# Patient Record
(demographics unavailable — no encounter records)

---

## 2024-12-18 NOTE — REVIEW OF SYSTEMS
[Negative] : Heme/Lymph [Sinus Pressure] : no sinus pressure [Abdominal Pain] : no abdominal pain [Heartburn] : no heartburn [Joint Pain] : no joint pain [Tingling (Paresthesia)] : no tingling

## 2024-12-18 NOTE — HISTORY OF PRESENT ILLNESS
[FreeTextEntry1] : 28 yo M here for evaluation prior to bariatric sx. pmhx of wpw, pAF s/p ablation.   having gastric sleeve sx with Dr Elmer Olvera-[facility]. good functional capacity-has been going to the gym, lost 10 lbs after significant weight gain this year. has been noticing episodes of pAF more frequently recently-2x in the past week. happens for seconds to minutes at a time. no sob during this period but feels uncomfortable and that his heart is racing. pt denies chest pain, palpitations, SOB, orthopnea. parathyroid levels were severely elevated - being evaluated by an endocrinologist.   ekg -sr

## 2025-02-09 NOTE — ASSESSMENT
[FreeTextEntry1] : 27M PMHx HTN, HLD, Mild Asthma, pAfib (on ASA), WPW with cardiac arrest in 1/2018 at OSH (but no PPM/AICD placed), PUD, diverticulitis, Bipolar Disorder, h/o multiple episodes of Pericarditis w/ Pericardial Effusion s/p drainage in 3/2018 at [facility] and hospitalization at [facility], former patient of Dr Wells presenting with complaints of recent flare up of diverticulitis, decreased appetite, irregular bowel habits.   #Irregular bowel habits  #Diverticulosis  Reviewed most recent hospital records, with no e/o diverticulitis. Has e/o diverticulosis on CT A/P. Presentation more consistent with IBS vs medication induced constipation with some component of overflow diarrhea.  -Recommended a trial of Metamucil or Citrucel  -Ordered for CRP, fecal calprotectin, celiac serologies -Will obtain outside 2023 colonoscopy record from Dr Kelvin Valle's office to review. Pending above BW and stool tests and review of report, will determine role for colonoscopy  -Provided samples for IBGard   #Reflux  #Class II Obesity, BMI 39.02 Controlled for now off medication  Denies any dysphagia, odynophagia  Reviewed previous EGD reports  Continue to monitor for now Ongoing f/u with bariatrics   RTC in 3months

## 2025-02-09 NOTE — ASSESSMENT
[FreeTextEntry1] : 27M PMHx HTN, HLD, Mild Asthma, pAfib (on ASA), WPW with cardiac arrest in 1/2018 at OSH (but no PPM/AICD placed), PUD, diverticulitis, Bipolar Disorder, h/o multiple episodes of Pericarditis w/ Pericardial Effusion s/p drainage in 3/2018 at [facility] and hospitalization at [facility], former patient of Dr Wells presenting with complaints of recent flare up of diverticulitis, decreased appetite, irregular bowel habits.   #Irregular bowel habits  #Diverticulosis  Reviewed most recent hospital records, with no e/o diverticulitis. Has e/o diverticulosis on CT A/P. Presentation more consistent with IBS vs medication induced constipation with some component of overflow diarrhea.  -Recommended a trial of Metamucil or Citrucel  -Ordered for CRP, fecal calprotectin, celiac serologies -Will obtain outside 2023 colonoscopy record from Dr Kelvin Valle's office to review. Pending above BW and stool tests and review of report, will determine role for colonoscopy  -Provided samples for IBGard   #Reflux  #Class II Obesity, BMI 39.02 Controlled for now off medication  Denies any dysphagia, odynophagia  Reviewed previous EGD reports  Continue to monitor for now Ongoing f/u with bariatrics   RTC in 3months

## 2025-02-09 NOTE — HISTORY OF PRESENT ILLNESS
[FreeTextEntry1] : HPI- 27M PMHx HTN, HLD, Mild Asthma, pAfib (on ASA), WPW with cardiac arrest in 1/2018 at OSH (but no PPM/AICD placed), PUD, diverticulitis, Bipolar Disorder, h/o multiple episodes of Pericarditis w/ Pericardial Effusion s/p drainage in 3/2018 at [facility] and hospitalization at [facility], former patient of Dr Wells presenting with complaints of recent flare up of diverticulitis, decreased appetite, irregular bowel habits.   Patient presented to [facility] ED on 1/14/25, at which time he presented with complaints of abdominal pain. With mild leukocytosis 13.24. Alk phos 194, lipase WNL (11). Remainder of BW WNL. CT A/P with IV contrast (no PO) revealing no bowel inflammation. With e/o colonic diverticulosis w/o e/o diverticulitis. No bowel obstruction. Patient states receiving IV abxs when he was in the ED however per record review, no abxs given. Only given Morphine, IV Tylenol, Zofran and IVF. Patient discharged from ED and recommended for outpatient GI f/u.   Patient states his pain has since resolved. Notes that historically he has had multiple flare ups of his diverticulitis, requiring several courses of abxs in the past for this (per record review, see multiple courses of abxs prescribed in the past, Augmentin).   Notes that preceding this ED visit, he noticed a change in his bowel habits 3-4 months preceding this visit. Notes that he is either backed up for several days or has diarrhea for several days at a time. Currently denies any pain now or 3-4 months ago when he noticed a change in his bowel habits. Since his ED visit, has had a decrease in his appetite, lost approx 10 lbs (per record review), and feels like he's noticed his stools to look thin. Non-bloody stools.   Previously followed with Dr Wells in the past for hematochezia.   Patient states he has a history of reflux. Well controlled, currently not taking any antacids. Denies any dysphagia, odynophagia.   Remainder of ROS negative.  BW: Hgb 14.5 (1/29/24) Cr 0.84 (1/29/24)  PMHx - HTN, HLD, Mild Asthma, pAfib (on ASA), WPW with cardiac arrest in 1/2018 at OSH (but no PPM/AICD placed), PUD, diverticulitis, Bipolar Disorder, h/o multiple episodes of Pericarditis w/ Pericardial Effusion s/p drainage in 3/2018 at [facility] and hospitalization at [facility] PSHx - knee, hip, spine, appendectomy, CCY (2019) Rx - ASA, tramadol 50 mg TID PRN, lyrica 100 mg TID, ambien, mirtazapine for insmonia, metoprolol Supplements/herbs/OTC - denies A/C or NSAIDs? - denies FMHx -grew up in foster care Allergies - albuterol (causes Vtach), claritin EtOH -2-3x/week Smoking - never smoker Drugs - marijuana   VCE (10/29/2018) - normal  EGD (1/12/23): moderate HH, irregular GEJ, mild esophagitis, moderate gastritis, normal duodenum (rec start PPI)  EGD & Cscope 10/2018-hiatal hernia; normal cscope. Biopsies of stomach and TI normal.  EGD & Cscope 8/2018 - linear stomach erosions, 6mm clean based ulcer at fundus, bile gastritis ; cscope poor prep

## 2025-02-09 NOTE — HISTORY OF PRESENT ILLNESS
[FreeTextEntry1] : HPI- 27M PMHx HTN, HLD, Mild Asthma, pAfib (on ASA), WPW with cardiac arrest in 1/2018 at OSH (but no PPM/AICD placed), PUD, diverticulitis, Bipolar Disorder, h/o multiple episodes of Pericarditis w/ Pericardial Effusion s/p drainage in 3/2018 at [facility] and hospitalization at [facility], former patient of Dr Wells presenting with complaints of recent flare up of diverticulitis, decreased appetite, irregular bowel habits.   Patient presented to [facility] ED on 1/14/25, at which time he presented with complaints of abdominal pain. With mild leukocytosis 13.24. Alk phos 194, lipase WNL (11). Remainder of BW WNL. CT A/P with IV contrast (no PO) revealing no bowel inflammation. With e/o colonic diverticulosis w/o e/o diverticulitis. No bowel obstruction. Patient states receiving IV abxs when he was in the ED however per record review, no abxs given. Only given Morphine, IV Tylenol, Zofran and IVF. Patient discharged from ED and recommended for outpatient GI f/u.   Patient states his pain has since resolved. Notes that historically he has had multiple flare ups of his diverticulitis, requiring several courses of abxs in the past for this (per record review, see multiple courses of abxs prescribed in the past, Augmentin).   Notes that preceding this ED visit, he noticed a change in his bowel habits 3-4 months preceding this visit. Notes that he is either backed up for several days or has diarrhea for several days at a time. Currently denies any pain now or 3-4 months ago when he noticed a change in his bowel habits. Since his ED visit, has had a decrease in his appetite, lost approx 10 lbs (per record review), and feels like he's noticed his stools to look thin. Non-bloody stools.   Previously followed with Dr Wells in the past for hematochezia.   Patient states he has a history of reflux. Well controlled, currently not taking any antacids. Denies any dysphagia, odynophagia.   Remainder of ROS negative.  BW: Hgb 14.5 (1/29/24) Cr 0.84 (1/29/24)  PMHx - HTN, HLD, Mild Asthma, pAfib (on ASA), WPW with cardiac arrest in 1/2018 at OSH (but no PPM/AICD placed), PUD, diverticulitis, Bipolar Disorder, h/o multiple episodes of Pericarditis w/ Pericardial Effusion s/p drainage in 3/2018 at [facility] and hospitalization at [facility] PSHx - knee, hip, spine, appendectomy, CCY (2019) Rx - ASA, tramadol 50 mg TID PRN, lyrica 100 mg TID, ambien, mirtazapine for insmonia, metoprolol Supplements/herbs/OTC - denies A/C or NSAIDs? - denies FMHx -grew up in foster care Allergies - albuterol (causes Vtach), claritin EtOH -2-3x/week Smoking - never smoker Drugs - marijuana   VCE (10/29/2018) - normal  EGD (1/12/23): moderate HH, irregular GEJ, mild esophagitis, moderate gastritis, normal duodenum (rec start PPI)  EGD & Cscope 10/2018-hiatal hernia; normal cscope. Biopsies of stomach and TI normal.  EGD & Cscope 8/2018 - linear stomach erosions, 6mm clean based ulcer at fundus, bile gastritis ; cscope poor prep

## 2025-02-09 NOTE — REVIEW OF SYSTEMS
[Recent Weight Loss (___ Lbs)] : recent [unfilled] ~Ulb weight loss [As Noted in HPI] : as noted in HPI [Constipation] : constipation [Diarrhea] : diarrhea [Negative] : Heme/Lymph [Abdominal Pain] : no abdominal pain [Vomiting] : no vomiting [Heartburn] : no heartburn [Melena (black stool)] : no melena [Bleeding] : no bleeding [Fecal Incontinence (soiling)] : no fecal incontinence [Bloating (gassiness)] : no bloating

## 2025-02-09 NOTE — PHYSICAL EXAM
[Alert] : alert [Normal Voice/Communication] : normal voice/communication [Healthy Appearing] : healthy appearing [No Acute Distress] : no acute distress [Obese (BMI >= 30)] : obese (BMI >= 30) [Sclera] : the sclera and conjunctiva were normal [Hearing Threshold Finger Rub Not [place]] : hearing was normal [Normal Lips/Gums] : the lips and gums were normal [Oropharynx] : the oropharynx was normal [Normal Appearance] : the appearance of the neck was normal [No Neck Mass] : no neck mass was observed [No Respiratory Distress] : no respiratory distress [No Acc Muscle Use] : no accessory muscle use [Respiration, Rhythm And Depth] : normal respiratory rhythm and effort [No Masses] : no abdominal mass palpated [Abdomen Soft] : soft [Cervical Lymph Nodes Enlarged Posterior Bilaterally] : no posterior cervical lymphadenopathy [Supraclavicular Lymph Nodes Enlarged Bilaterally] : no supraclavicular lymphadenopathy [Axillary Lymph Nodes Enlarged Bilaterally] : no axillary lymphadenopathy [No CVA Tenderness] : no CVA  tenderness [No Spinal Tenderness] : no spinal tenderness [Abnormal Walk] : normal gait [No Clubbing, Cyanosis] : no clubbing or cyanosis of the fingernails [No Joint Swelling] : no joint swelling seen [Normal Color / Pigmentation] : normal skin color and pigmentation [] : no rash [Cranial Nerves Intact] : cranial nerves 2-12 were intact [Sensation] : the sensory exam was normal to light touch and pinprick [Motor Exam] : the motor exam was normal [Oriented To Time, Place, And Person] : oriented to person, place, and time [de-identified] : RUQ tenderness

## 2025-02-09 NOTE — PHYSICAL EXAM
[Alert] : alert [Normal Voice/Communication] : normal voice/communication [Healthy Appearing] : healthy appearing [No Acute Distress] : no acute distress [Obese (BMI >= 30)] : obese (BMI >= 30) [Sclera] : the sclera and conjunctiva were normal [Hearing Threshold Finger Rub Not [place]] : hearing was normal [Normal Lips/Gums] : the lips and gums were normal [Oropharynx] : the oropharynx was normal [Normal Appearance] : the appearance of the neck was normal [No Neck Mass] : no neck mass was observed [No Respiratory Distress] : no respiratory distress [No Acc Muscle Use] : no accessory muscle use [Respiration, Rhythm And Depth] : normal respiratory rhythm and effort [No Masses] : no abdominal mass palpated [Abdomen Soft] : soft [Cervical Lymph Nodes Enlarged Posterior Bilaterally] : no posterior cervical lymphadenopathy [Supraclavicular Lymph Nodes Enlarged Bilaterally] : no supraclavicular lymphadenopathy [Axillary Lymph Nodes Enlarged Bilaterally] : no axillary lymphadenopathy [No CVA Tenderness] : no CVA  tenderness [No Spinal Tenderness] : no spinal tenderness [Abnormal Walk] : normal gait [No Clubbing, Cyanosis] : no clubbing or cyanosis of the fingernails [No Joint Swelling] : no joint swelling seen [Normal Color / Pigmentation] : normal skin color and pigmentation [] : no rash [Cranial Nerves Intact] : cranial nerves 2-12 were intact [Sensation] : the sensory exam was normal to light touch and pinprick [Motor Exam] : the motor exam was normal [Oriented To Time, Place, And Person] : oriented to person, place, and time [de-identified] : RUQ tenderness

## 2025-04-15 NOTE — ASSESSMENT
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 Pt understands that given the year lapse in his workup, we will need to restart the bariatric testings for sx. Will need to obtain path and will obtain an UGI. Pt tachy to 124 and pt was diaphoretic in the room. Pt started to complain of severe lower abdominal pain and states over the weekend he went to an urgent care and was told to go to the ED for possible SBO as he presented with vomiting and constipation. Pt states he has not had a BM since sunday and has been in severe abdominal pain since sunday.   At this point HR was rechecked and was 106 with persistent diaphoresis and radial pulse was attempted to be palpated, was unable. Explained to pt given his clinical presentation, tachycardia, diaphoresis, and cardiac history, we are recommending him to go to [facility] ED immediately. Pt then stood up from exam table and stated "he needed to go to work" and had syncopal episode falling to the floor, did not appear to hit his head on counter and without LOC. VS were rechecked, tachy to 156, BP unable to be obtained. Pt was verbal and recalled syncopal event. EMS was called immediately and pt was taken to [facility] ED.   I, Dr. Frank Hwang personally performed the evaluation and management (E/M) services for this patient. That E/M includes conducting the clinically appropriate initial history &/or exam, assessing all conditions, and establishing the plan of care. Today, my PA, Alicja Ho, was present during my evaluation and management service for this patient and scribed the encounter and was here to observe my E/M service for this patient and follow plan of care established by me going forward.

## 2025-04-15 NOTE — PHYSICAL EXAM
[Obese] : obese [de-identified] : diaphoretic [de-identified] : pt holding abdomen in severe pain

## 2025-04-15 NOTE — HISTORY OF PRESENT ILLNESS
[de-identified] : Pt is a 28 y/o M with pmhx of morbid obesity BMI 39, WPW, A-fib not on anticoagulation following , hx of pericarditis and pericardial effusion, hx of multiple joint surgeries now with chronic right leg pain 2/to nerve injury on tramadol, hx of diverticulosis with recent colonoscopy wnl, hx of food dependent GERD last EGD 2023 with biopsies not available for today's visit, hx of laparoscopic appendectomy and cholecystectomy, who presents today for f/u on his bariatric workup. Pt understands that given the year lapse in his workup, we will need to restart the bariatric testings for sx. Will need to obtain path and will obtain an UGI. Pt tachy to 124 and pt was diaphoretic in the room. Pt started to complain of severe lower abdominal pain and states over the weekend he went to an urgent care and was told to go to the ED for possible SBO as he presented with vomiting and constipation. Pt states he has not had a BM since sunday and has been in severe abdominal pain.   At this point HR was rechecked and was 106 with persistent diaphoresis and radial pulse was attempted to be palpated, was unable. Explained to pt given his clinical presentation, tachycardia, diaphoresis, and cardiac history, we are recommending him to go to [facility] ED. Pt then stood up from exam table and stated "he needed to go to work" and had syncopal episode falling to the floor, did not appear to hit his head on counter and without LOC. VS were rechecked, tachy to 156, BP unable to be obtained. Pt was verbal and recalled syncopal event. EMS was called immediately and pt was taken to [facility] ED.

## 2025-06-02 NOTE — ASSESSMENT
[Patient Optimized for Surgery] : Patient optimized for surgery [No Further Testing Recommended] : no further testing recommended [As per surgery] : as per surgery [Continue medications as is] : Continue current medications [FreeTextEntry4] : low risk procedure pt with good functional status (METS>4) Pt to see his cardiologist later today, deferred EKG to be performed by cardio at that time

## 2025-06-02 NOTE — HISTORY OF PRESENT ILLNESS
[Atrial Fibrillation] : atrial fibrillation [No Adverse Anesthesia Reaction] : no adverse anesthesia reaction in self or family member [(Patient denies any chest pain, claudication, dyspnea on exertion, orthopnea, palpitations or syncope)] : Patient denies any chest pain, claudication, dyspnea on exertion, orthopnea, palpitations or syncope [Moderate (4-6 METs)] : Moderate (4-6 METs) [Asthma] : asthma [Aortic Stenosis] : no aortic stenosis [Coronary Artery Disease] : no coronary artery disease [Recent Myocardial Infarction] : no recent myocardial infarction [Implantable Device/Pacemaker] : no implantable device/pacemaker [COPD] : no COPD [Sleep Apnea] : no sleep apnea [Smoker] : not a smoker [Chronic Anticoagulation] : no chronic anticoagulation [Chronic Kidney Disease] : no chronic kidney disease [Diabetes] : no diabetes [FreeTextEntry1] : Xiao Nicole [FreeTextEntry2] : 6/13/25 [FreeTextEntry3] : Dr Baum [FreeTextEntry4] : Pt is a 28 y/o M with pmhx of morbid obesity BMI 39, WPW, A-fib not on AC, hx of pericarditis and pericardial effusion, hx of multiple joint surgeries now with chronic right leg pain 2/to nerve injury on tramadol, hx of diverticulosis with recent colonoscopy wnl, hx of food dependent GERD last EGD 2023 with biopsies not available for today's visit, hx of laparoscopic appendectomy and cholecystectomy  who comes for pre op optimization. No complaints today. Pt is able to complete 2 flights of stairs without SOB/CP.

## 2025-06-12 NOTE — ASSESSMENT
Pt returning nurse call        [FreeTextEntry1] : pre op -optimized for upcoming podiatry procedure from cardiology standpoint -no cardiac sxs  -good functional capacity, can exert >4.6 METs without sxs -holter done 12/2024 with no afib recurrence -cont metop 25mg BID  -follow up with GI MD regarding glp1 as he has a complex gi hx   afib -in sr today, asymptomatic  -not on AC

## 2025-06-12 NOTE — HISTORY OF PRESENT ILLNESS
[FreeTextEntry1] : 28 yo M here for evaluation prior to bunion sx with Dr Rachelle Baum on 6/13/25. pmhx of wpw, pAF s/p ablation.   overall has been doing well. no noted flare ups of his af. pt denies chest pain, palpitations, SOB, orthopnea. gets winded only after 4 flights of stairs. he is interested in glp1.   ekg -sr

## 2025-06-12 NOTE — HISTORY OF PRESENT ILLNESS
[FreeTextEntry1] : 26 yo M here for evaluation prior to bunion sx with Dr Rachelle Baum on 6/13/25. pmhx of wpw, pAF s/p ablation.   overall has been doing well. no noted flare ups of his af. pt denies chest pain, palpitations, SOB, orthopnea. gets winded only after 4 flights of stairs. he is interested in glp1.   ekg -sr

## 2025-06-12 NOTE — ASSESSMENT
[FreeTextEntry1] : pre op -optimized for upcoming podiatry procedure from cardiology standpoint -no cardiac sxs  -good functional capacity, can exert >4.6 METs without sxs -holter done 12/2024 with no afib recurrence -cont metop 25mg BID  -follow up with GI MD regarding glp1 as he has a complex gi hx   afib -in sr today, asymptomatic  -not on AC
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
## 2025-06-24 NOTE — ASSESSMENT
[FreeTextEntry1] :  27 year M with h/o  obesity, WPW, pAF (s/p ablation), former HTN, pericarditis/effusion (s/p drainage 2018), chronic leg pain (nerve injury), diverticulosis, GERD, h/o appendectomy/cholecystectomy, asthma, anxiety for hyperparathyroidism.   #)  Prior calcium levels wnl.  11/2024 outside ([facility]) lab showing vit D 9.6 and intact PTH elevated 120 (<65). -prior nonpathological fractures in childhood/teens (traumatic). Prior nephrolithiasis episode at 18 yo.  -no prior hypercalcemia on labs. -chronic nausea (in setting of multiple meds, as per pt) for which pt takes Linzess, Zofran PRN and follows w GI.  -likely hyperparathyroidism in setting of low vit D. Check vit D, PTH, phos, Ca today. Plan for repletion with vit D pending results.  #) FMH reportedly of Graves' disease (mother and brother). -check TSH   RTC: 3 months.   I have spent 40 minutes of time on the encounter. This time included review of previous records, lab. and radiological data, discussing findings, differential diagnosis, further testing and evaluation, therapeutic options, renewing medications, completing the record.

## 2025-06-24 NOTE — PHYSICAL EXAM
[Alert] : alert [Obese] : obese [No Acute Distress] : no acute distress [Normal Sclera/Conjunctiva] : normal sclera/conjunctiva [No LAD] : no lymphadenopathy [No Respiratory Distress] : no respiratory distress [Clear to Auscultation] : lungs were clear to auscultation bilaterally [Normal Rate] : heart rate was normal [Regular Rhythm] : with a regular rhythm [No Edema] : no peripheral edema [No CVA Tenderness] : no ~M costovertebral angle tenderness [No Spinal Tenderness] : no spinal tenderness [No Stigmata of Cushings Syndrome] : no stigmata of Cushings Syndrome [Oriented x3] : oriented to person, place, and time [de-identified] : nontender neck; no palpable goiter, no palpable thyroid nodules.

## 2025-06-24 NOTE — REVIEW OF SYSTEMS
[Neck Pain] : no neck pain [Chest Pain] : no chest pain [Joint Pain] : joint pain [Negative] : Respiratory [All other systems negative] : All other systems negative [FreeTextEntry7] : occasional nausea